# Patient Record
(demographics unavailable — no encounter records)

---

## 2025-04-07 NOTE — SOCIAL HISTORY
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 Has one daughter in same building, two granddaughters. 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

## 2025-04-07 NOTE — PHYSICAL EXAM
[Alert] : alert [No Acute Distress] : in no acute distress [Sclera] : the sclera and conjunctiva were normal [PERRL] : pupils were equal in size, round, and reactive to light [Normal Outer Ear/Nose] : the ears and nose were normal in appearance [Normal Appearance] : the appearance of the neck was normal [Supple] : the neck was supple [No Respiratory Distress] : no respiratory distress [Auscultation Breath Sounds / Voice Sounds] : lungs were clear to auscultation bilaterally [Normal S1, S2] : normal S1 and S2 [Heart Rate And Rhythm] : heart rate was normal and rhythm regular [Edema] : edema was not present [Bowel Sounds] : normal bowel sounds [Abdomen Tenderness] : non-tender [Abdomen Soft] : soft [No Spinal Tenderness] : no spinal tenderness [Normal Gait] : normal gait [Involuntary Movements] : no involuntary movements were seen [Motor Tone] : muscle strength and tone were normal [No Focal Deficits] : no focal deficits [Normal Affect] : the affect was normal [Normal Mood] : the mood was normal [JVD] : there was jugular-venous distention [FreeTextEntry1] :  able to read regular prints 29-Jan-2023 12:22

## 2025-04-07 NOTE — ASSESSMENT
[FreeTextEntry1] : A/P:  # HTN Denied significant HTN only "borderline BP" for many years Reported home -130s.  Good BP in office today Continue Metoprolol and Losartan  # HLD: On Lipitor and Zetia, reported stable for 20 years Will repeat lipids  # AS, s/p AVR  Reported AVR many years ago, doing well  Seeing cardiologist regularly, last ECH within normal a year ago.  Recommended to get most recent note and ALICIA report for record and review.  He does not want his MDs to know he is seeing new doctor. Advised to get via Epic Mychart. He said he will ask his wife to help.   # DM: Pre-DM for many years, On Metformin 500mg for long time 12/2024 A1C reached to 6.5%  # BPH: Sees MAIDA at [place], only on Flomax 0.4mg, doing well.   # Glaucoma  Cataract surgery b/l about 30 years ago--no problem  Glaucoma, sees ophthalmologist every 6 months  # h/o Skin cancer BCC 8 years per report, did not follow up.  Non urgent follow up recommended.  Immunization: Flu vaccine, Covid booster done last fall per report Prevnar 10/2024 in pharmacy per report.  Reported got Shingle and vaccine many years ago. Shingrix to be checked with pharmacy, to have it if not yet done.  Tdap to be done in pharmacy   Follow up in 6 months or sooner as needed.

## 2025-04-07 NOTE — ASSESSMENT
[FreeTextEntry1] : A/P:  # HTN Denied significant HTN only "borderline BP" for many years Reported home -130s.  Good BP in office today Continue Metoprolol and Losartan  # HLD: On Lipitor and Zetia, reported stable for 20 years Will repeat lipids  # AS, s/p AVR  Reported AVR many years ago, doing well  Seeing cardiologist regularly, last ECH within normal a year ago.  Recommended to get most recent note and ALICIA report for record and review.  He does not want his MDs to know he is seeing new doctor. Advised to get via Epic Mychart. He said he will ask his wife to help.   # DM: Pre-DM for many years, On Metformin 500mg for long time 12/2024 A1C reached to 6.5%  # BPH: Sees MAIDA at [place], only on Flomax 0.4mg, doing well.   # Glaucoma  Cataract surgery b/l about 30 years ago--no problem  Glaucoma, sees ophthalmologist every 6 months  # h/o Skin cancer BCC 8 years per report, did not follow up.  Non urgent follow up recommended.  Immunization: Flu vaccine, Covid booster done last fall per report Prevnar 10/2024 in pharmacy per report.  Reported got Shingle and vaccine many years ago. Shingrix to be checked with pharmacy, to have it if not yet done.  Tdap to be done in pharmacy   Follow up in 6 months or sooner as needed.

## 2025-04-07 NOTE — REVIEW OF SYSTEMS
[As Noted in HPI] : as noted in HPI [Nocturia] : nocturia [Fever] : no fever [Chills] : no chills [Feeling Poorly] : not feeling poorly [Feeling Tired] : not feeling tired [Recent Weight Gain (___ Lbs)] : no recent weight gain [Recent Weight Loss (___ Lbs)] : no recent weight loss [Eye Pain] : no eye pain [Eyesight Problems] : no eyesight problems [Earache] : no earache [Loss Of Hearing] : no hearing loss [Chest Pain] : no chest pain [Palpitations] : no palpitations [Lower Ext Edema] : no extremity edema [Shortness Of Breath] : no shortness of breath [Cough] : no cough [SOB on Exertion] : no shortness of breath during exertion [Orthopnea] : no orthopnea [Abdominal Pain] : no abdominal pain [Constipation] : no constipation [Dysuria] : no dysuria [Incontinence] : no incontinence [Arthralgias] : no arthralgias [Skin Lesions] : no skin lesions [Dizziness] : no dizziness [Fainting] : no fainting [Anxiety] : no anxiety [Depression] : no depression [Muscle Weakness] : no muscle weakness

## 2025-04-07 NOTE — SOCIAL HISTORY
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 Has one daughter in same building, two granddaughters. 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

## 2025-04-07 NOTE — HISTORY OF PRESENT ILLNESS
[No falls in past year] : Patient reported no falls in the past year [Completely Independent] : Completely independent. 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   He has limited records here in [facility], only had some ophthalm visits, last one 11/25/20.   History reviewed and clarified with the patient; medication list reviewed as well.    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 He never had a PCP. Now he and his friends are older, he thinks it's good idea to get a younger doctor, who is closer to his home in case he needs more care in near future.   He reported taking ASA, Lipitor, metoprolol and Metformin since age 65 for prevention, and did well so far. He has been pre-DM for over 20 years, HbA1C just recently got 6.5.  He reported taking Flomax for BPH for many years, only has some nocturia without other symptoms. PSA was in 2's for long time.   He brought in a copy of EKG from 12/16/24, which showed SB with PVC and PACs and ST-T abnormality. Labs from same day HbA1C 6.5, Hb 13.1, high B12 1941     He reported ECHO one year ago, wnl. Reported AVR many years ago, not sure if he needed CABG at the same time.   He reported highly functional, walks 20 blocks without device every day. No falls. Independent with all ADLs and IADLs He is still driving and doing all housework and yard work for his country house including lawn care, also [place] in [place].  He only noted walking slower now, and unable to carry heavy objects.    Reported taking D3 1000u B12 500mcg besides his regular meds.  [TextBox_25] : Reported DEXA normal many years ago [TextBox_31] : Good without HA [TextBox_37] : able to read small prints. Last visit 2 months ago [TextBox_43] : full dentures, working well [TextBox_19] : colonoscope 3 years ago, 2 polyps removed. No follow up recommended [FreeTextEntry2] : BCC 8 years, did not follow up.  [de-identified] : No device needed [de-identified] : driving without problem  [[id number]] : 0 [FreeTextEntry4] : HCP: wife and daughter. Reported Living Will done.  Recommended to bring copy next visit. Full code, full medical intervention as of now.

## 2025-04-07 NOTE — PHYSICAL EXAM
[Alert] : alert [No Acute Distress] : in no acute distress [Sclera] : the sclera and conjunctiva were normal [PERRL] : pupils were equal in size, round, and reactive to light [Normal Outer Ear/Nose] : the ears and nose were normal in appearance [Normal Appearance] : the appearance of the neck was normal [Supple] : the neck was supple [No Respiratory Distress] : no respiratory distress [Auscultation Breath Sounds / Voice Sounds] : lungs were clear to auscultation bilaterally [Normal S1, S2] : normal S1 and S2 [Heart Rate And Rhythm] : heart rate was normal and rhythm regular [Edema] : edema was not present [Bowel Sounds] : normal bowel sounds [Abdomen Tenderness] : non-tender [Abdomen Soft] : soft [No Spinal Tenderness] : no spinal tenderness [Normal Gait] : normal gait [Involuntary Movements] : no involuntary movements were seen [Motor Tone] : muscle strength and tone were normal [No Focal Deficits] : no focal deficits [Normal Affect] : the affect was normal [Normal Mood] : the mood was normal [JVD] : there was jugular-venous distention [FreeTextEntry1] :  able to read regular prints

## 2025-04-07 NOTE — HISTORY OF PRESENT ILLNESS
[No falls in past year] : Patient reported no falls in the past year [Completely Independent] : Completely independent. 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   He has limited records here in [facility], only had some ophthalm visits, last one 11/25/20.   History reviewed and clarified with the patient; medication list reviewed as well.    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 He never had a PCP. Now he and his friends are older, he thinks it's good idea to get a younger doctor, who is closer to his home in case he needs more care in near future.   He reported taking ASA, Lipitor, metoprolol and Metformin since age 65 for prevention, and did well so far. He has been pre-DM for over 20 years, HbA1C just recently got 6.5.  He reported taking Flomax for BPH for many years, only has some nocturia without other symptoms. PSA was in 2's for long time.   He brought in a copy of EKG from 12/16/24, which showed SB with PVC and PACs and ST-T abnormality. Labs from same day HbA1C 6.5, Hb 13.1, high B12 1941     He reported ECHO one year ago, wnl. Reported AVR many years ago, not sure if he needed CABG at the same time.   He reported highly functional, walks 20 blocks without device every day. No falls. Independent with all ADLs and IADLs He is still driving and doing all housework and yard work for his country house including lawn care, also swimming in [place] lake in the summer.  He only noted walking slower now, and unable to carry heavy objects.    Reported taking D3 1000u B12 500mcg besides his regular meds.  [TextBox_25] : Reported DEXA normal many years ago [TextBox_31] : Good without HA [TextBox_37] : able to read small prints. Last visit 2 months ago [TextBox_43] : full dentures, working well [TextBox_19] : colonoscope 3 years ago, 2 polyps removed. No follow up recommended [FreeTextEntry2] : BCC 8 years, did not follow up.  [de-identified] : No device needed [de-identified] : driving without problem  [[id number]] : 0 [FreeTextEntry4] : HCP: wife and daughter. Reported Living Will done.  Recommended to bring copy next visit. Full code, full medical intervention as of now.